# Patient Record
Sex: FEMALE | Race: WHITE
[De-identification: names, ages, dates, MRNs, and addresses within clinical notes are randomized per-mention and may not be internally consistent; named-entity substitution may affect disease eponyms.]

---

## 2018-09-03 ENCOUNTER — HOSPITAL ENCOUNTER (EMERGENCY)
Dept: HOSPITAL 80 - FED | Age: 23
Discharge: HOME | End: 2018-09-03
Payer: MEDICAID

## 2018-09-03 VITALS — DIASTOLIC BLOOD PRESSURE: 64 MMHG | SYSTOLIC BLOOD PRESSURE: 98 MMHG

## 2018-09-03 DIAGNOSIS — J02.9: ICD-10-CM

## 2018-09-03 DIAGNOSIS — J06.9: ICD-10-CM

## 2018-09-03 DIAGNOSIS — J40: Primary | ICD-10-CM

## 2018-09-03 NOTE — EDPHY
HPI/HX/ROS/PE/MDM


Narrative: 





CHIEF COMPLAINT:  Sore throat 





HISTORY OF PRESENT ILLNESS:   





This patient is a 22 year old female complaining of sore throat and difficulty 

breathing. She began feeling unwell several days ago and initially attributed 

this to seasonal allergies as she had rhinorrhea and a sore throat. Yesterday, 

she developed a cough and a worse sore throat and stayed home from work. She 

felt febrile and her  noted she had a fever after checking her 

temperature. The patient is unsure how high her temperature was. She also 

vomited once after eating. Today, she continues to have a sore throat and woke 

with some difficulty breathing. She endorses tightness in her chest and a sharp 

pain in the mid-sternal area with deep inspiration. She has history of 

childhood asthma and her symptoms feel somewhat similar to this. The patient 

has tried DayQuil for relief of symptoms. No chills,  palpitations, diarrhea, 

urinary complaints, headache, lightheadedness. 





REVIEW OF SYSTEMS:


A comprehensive 10 system review of systems is otherwise negative aside from 

elements mentioned in the history of present illness and medical decision 

making.





PAST MEDICAL HISTORY: Childhood asthma. Mirena IUD in place.   





SOCIAL HISTORY: .  and child at bedside.  Does not abuse tobacco, 

drugs, or alcohol. 








VITAL SIGNS: Reviewed by me


GENERAL: Well-developed, well-nourished, resting comfortably in no respiratory 

distress.


HEENT: Atraumatic. Eyes: No icterus, no injection. Mouth: moist mucous 

membranes. Erythematous tonsillar pillars bilaterally, no exudates or lesions, 

minimal tonsillar hypertrophy. Neck: supple with no adenopathy.


LUNGS: Lower left chest wall tenderness to palpation. Breath sounds diminished 

throughout, no wheezes, rhonchi or rales.


CARDIAC: Regular rate and rhythm, borderline tachycardic. No rubs, murmurs or 

gallops.


ABDOMEN: Soft, nontender, nondistended, bowel sounds normal.


BACK:  No CVA tenderness.


EXTREMITIES: No trauma. No edema.  Range of motion is normal throughout.


NEURO: Alert and oriented,  grossly nonfocal.  


SKIN: Warm and dry, no rash.


PSYCHIATRIC: Normal mentation, no agitation.





Portions of this note were transcribed by a medical scribe.  I personally 

performed a history, physical exam, medical decision making, and confirmed 

accuracy of information the transcribed note.





ED Course: 





23 y/o female presents with sore throat and difficulty breathing. On exam, 

breath sounds are diminished throughout, no wheezes, rhonchi or rales. Plan for 

chest x-ray, DuoNeb, strep swab. 





CXR negative for pneumonia.





Strep screen negative. 





On re-examination after the nebulizer:  Patient is moving much improved air.  

She reports her shortness of breath feels better.  She will be discharged with 

azithromycin and albuterol neb meter dose inhaler.





Follow up and return precautions discussed. She is comfortable with this plan. 








MDM: 





Differential diagnosis for the patient's shortness of breath was considered 

including but not limited to pulmonary infectious processes, bronchospasm, 

bronchitis, pneumonia, viral infection.





- Data Points


Imaging Results: 


 Imaging Impressions





Chest X-Ray  09/03/18 14:15


Impression: Normal chest.











Imaging: I viewed and interpreted images myself


Laboratory Results: 


 











  09/03/18 09/03/18





  Unknown 14:10


 


Group A Strep Screen    NEGATIVE 





    (NEGATIVE) 


 


Group A Strep DNA  Pending   





   











Medications Given: 


 








Discontinued Medications





Albuterol (Proventil Neb)  3 ml IH EDNOW ONE


   Stop: 09/03/18 14:16


   Last Admin: 09/03/18 14:20 Dose:  3 ml








General


Time Seen by Provider: 09/03/18 14:03


Initial Vital Signs: 


 Initial Vital Signs











Temperature (C)  36.7 C   09/03/18 13:59


 


Heart Rate  112 H  09/03/18 13:59


 


Respiratory Rate  16   09/03/18 13:59


 


Blood Pressure  109/94 H  09/03/18 13:59


 


O2 Sat (%)  96   09/03/18 13:59








 











O2 Delivery Mode               Room Air














Allergies/Adverse Reactions: 


 





No Known Allergies Allergy (Unverified 09/03/18 13:59)


 








Home Medications: 














 Medication  Instructions  Recorded


 


Albuterol [Proventil Inhaler HFA 1 - 2 puffs IH Q4H #1 mdi 09/03/18





(*)]  


 


Azithromycin 250 - 500 mg PO DAILY #6 tablet 09/03/18














Departure





- Departure


Disposition: Home, Routine, Self-Care


Clinical Impression: 


 Bronchitis





Upper respiratory infection


Qualifiers:


 URI type: unspecified URI Qualified Code(s): J06.9 - Acute upper respiratory 

infection, unspecified





Pharyngitis


Qualifiers:


 Pharyngitis/tonsillitis etiology: unspecified etiology Qualified Code(s): 

J02.9 - Acute pharyngitis, unspecified





Condition: Good


Instructions:  Upper Respiratory Infection (ED), Acute Bronchitis (ED)


Additional Instructions: 


Please use the metered dose inhaler to help control your coughing and wheezing 

and shortness of breath.





You been given a prescription of azithromycin.  Please begin taking this as 

directed.





Over the counter cold medications often contain both antihistamines and 

decongestants.


If you have a runny nose, take an antihistamine.


If you are congested, take a decongestant.





If you have a sore throat, use Tylenol, or ibuprofen.  Throat lozenges, throat 

sprays, or salt water gargles may also be helpful.





Seek care urgently or follow up at the emergency department if he develop a 

fever, worsening symptoms despite the above treatment, shortness of breath, 

chest pain, vomiting, or other concerns.


Referrals: 


Mary Andrews PA [Primary Care Provider] - As per Instructions


Stand Alone Forms:  Statement of Treatment, Work Excuse


Prescriptions: 


Albuterol [Proventil Inhaler HFA (*)] 1 - 2 puffs IH Q4H #1 mdi


Azithromycin 250 - 500 mg PO DAILY #6 tablet


Report Scribed for: Malathi Miller


Report Scribed by: Monse Pak


Date of Report: 09/03/18


Time of Report: 16:42